# Patient Record
(demographics unavailable — no encounter records)

---

## 2024-12-09 NOTE — DISCUSSION/SUMMARY
[FreeTextEntry1] : UA clear- UC ordered- i f+ give bactrim ds bid x 10days  labs ordered due to hospitalization beginning of year and concern for infection

## 2024-12-09 NOTE — HISTORY OF PRESENT ILLNESS
[de-identified] : bilat hip pain x 4 days per mom this happened in the past pt hospitalized for sepsis secondary to UTI pt was asymptomatic  [FreeTextEntry6] : episode of feeling very cold - days ago- seemed ok and then hip pain - similar to prior illness with hip pain tuned to be from pyelonephritis and became septic because UC was (-)

## 2024-12-09 NOTE — HISTORY OF PRESENT ILLNESS
[de-identified] : bilat hip pain x 4 days per mom this happened in the past pt hospitalized for sepsis secondary to UTI pt was asymptomatic  [FreeTextEntry6] : episode of feeling very cold - days ago- seemed ok and then hip pain - similar to prior illness with hip pain tuned to be from pyelonephritis and became septic because UC was (-)